# Patient Record
Sex: FEMALE | Race: WHITE | Employment: FULL TIME | URBAN - METROPOLITAN AREA
[De-identification: names, ages, dates, MRNs, and addresses within clinical notes are randomized per-mention and may not be internally consistent; named-entity substitution may affect disease eponyms.]

---

## 2019-01-04 ENCOUNTER — APPOINTMENT (OUTPATIENT)
Dept: MRI IMAGING | Age: 24
End: 2019-01-04
Payer: MEDICAID

## 2019-01-04 ENCOUNTER — HOSPITAL ENCOUNTER (EMERGENCY)
Age: 24
Discharge: HOME | End: 2019-01-04
Attending: EMERGENCY MEDICINE
Payer: MEDICAID

## 2019-01-04 ENCOUNTER — APPOINTMENT (OUTPATIENT)
Dept: ULTRASOUND IMAGING | Age: 24
End: 2019-01-04
Payer: MEDICAID

## 2019-01-04 VITALS
HEART RATE: 89 BPM | TEMPERATURE: 98.1 F | HEIGHT: 63 IN | DIASTOLIC BLOOD PRESSURE: 87 MMHG | SYSTOLIC BLOOD PRESSURE: 142 MMHG | BODY MASS INDEX: 36.32 KG/M2 | WEIGHT: 205 LBS | RESPIRATION RATE: 18 BRPM | OXYGEN SATURATION: 97 %

## 2019-01-04 DIAGNOSIS — R10.2 PELVIC PAIN AFFECTING PREGNANCY IN FIRST TRIMESTER, ANTEPARTUM: Primary | ICD-10-CM

## 2019-01-04 DIAGNOSIS — N83.201 CYST OF RIGHT OVARY: ICD-10-CM

## 2019-01-04 DIAGNOSIS — O26.891 PELVIC PAIN AFFECTING PREGNANCY IN FIRST TRIMESTER, ANTEPARTUM: Primary | ICD-10-CM

## 2019-01-04 LAB
A/G RATIO: 1.3 (ref 1.1–2.2)
ABO/RH: NORMAL
ALBUMIN SERPL-MCNC: 4 G/DL (ref 3.4–5)
ALP BLD-CCNC: 71 U/L (ref 40–129)
ALT SERPL-CCNC: 19 U/L (ref 10–40)
ANION GAP SERPL CALCULATED.3IONS-SCNC: 12 MMOL/L (ref 3–16)
AST SERPL-CCNC: 21 U/L (ref 15–37)
BASOPHILS ABSOLUTE: 0.1 K/UL (ref 0–0.2)
BASOPHILS RELATIVE PERCENT: 0.8 %
BILIRUB SERPL-MCNC: <0.2 MG/DL (ref 0–1)
BILIRUBIN URINE: NEGATIVE
BLOOD, URINE: NEGATIVE
BUN BLDV-MCNC: 6 MG/DL (ref 7–20)
CALCIUM SERPL-MCNC: 9.2 MG/DL (ref 8.3–10.6)
CHLORIDE BLD-SCNC: 106 MMOL/L (ref 99–110)
CLARITY: CLEAR
CO2: 20 MMOL/L (ref 21–32)
COLOR: YELLOW
CREAT SERPL-MCNC: <0.5 MG/DL (ref 0.6–1.1)
EOSINOPHILS ABSOLUTE: 0.2 K/UL (ref 0–0.6)
EOSINOPHILS RELATIVE PERCENT: 1.5 %
GFR AFRICAN AMERICAN: >60
GFR NON-AFRICAN AMERICAN: >60
GLOBULIN: 3.2 G/DL
GLUCOSE BLD-MCNC: 85 MG/DL (ref 70–99)
GLUCOSE URINE: NEGATIVE MG/DL
GONADOTROPIN, CHORIONIC (HCG) QUANT: NORMAL MIU/ML
HCT VFR BLD CALC: 37.2 % (ref 36–48)
HEMOGLOBIN: 12.6 G/DL (ref 12–16)
KETONES, URINE: NEGATIVE MG/DL
LEUKOCYTE ESTERASE, URINE: NEGATIVE
LIPASE: 39 U/L (ref 13–60)
LYMPHOCYTES ABSOLUTE: 2.4 K/UL (ref 1–5.1)
LYMPHOCYTES RELATIVE PERCENT: 23.3 %
MCH RBC QN AUTO: 30.7 PG (ref 26–34)
MCHC RBC AUTO-ENTMCNC: 33.8 G/DL (ref 31–36)
MCV RBC AUTO: 90.8 FL (ref 80–100)
MICROSCOPIC EXAMINATION: NORMAL
MONOCYTES ABSOLUTE: 0.6 K/UL (ref 0–1.3)
MONOCYTES RELATIVE PERCENT: 5.7 %
NEUTROPHILS ABSOLUTE: 7.1 K/UL (ref 1.7–7.7)
NEUTROPHILS RELATIVE PERCENT: 68.7 %
NITRITE, URINE: NEGATIVE
PDW BLD-RTO: 12.7 % (ref 12.4–15.4)
PH UA: 7.5
PLATELET # BLD: 250 K/UL (ref 135–450)
PMV BLD AUTO: 8.4 FL (ref 5–10.5)
POTASSIUM SERPL-SCNC: 4 MMOL/L (ref 3.5–5.1)
PROTEIN UA: NEGATIVE MG/DL
RBC # BLD: 4.1 M/UL (ref 4–5.2)
SODIUM BLD-SCNC: 138 MMOL/L (ref 136–145)
SPECIFIC GRAVITY UA: 1.01
TOTAL PROTEIN: 7.2 G/DL (ref 6.4–8.2)
URINE REFLEX TO CULTURE: NORMAL
URINE TYPE: NORMAL
UROBILINOGEN, URINE: 0.2 E.U./DL
WBC # BLD: 10.3 K/UL (ref 4–11)

## 2019-01-04 PROCEDURE — 81003 URINALYSIS AUTO W/O SCOPE: CPT

## 2019-01-04 PROCEDURE — 36415 COLL VENOUS BLD VENIPUNCTURE: CPT

## 2019-01-04 PROCEDURE — 80053 COMPREHEN METABOLIC PANEL: CPT

## 2019-01-04 PROCEDURE — 86901 BLOOD TYPING SEROLOGIC RH(D): CPT

## 2019-01-04 PROCEDURE — 99284 EMERGENCY DEPT VISIT MOD MDM: CPT

## 2019-01-04 PROCEDURE — 76817 TRANSVAGINAL US OBSTETRIC: CPT

## 2019-01-04 PROCEDURE — 83690 ASSAY OF LIPASE: CPT

## 2019-01-04 PROCEDURE — 85025 COMPLETE CBC W/AUTO DIFF WBC: CPT

## 2019-01-04 PROCEDURE — 86900 BLOOD TYPING SEROLOGIC ABO: CPT

## 2019-01-04 PROCEDURE — 74181 MRI ABDOMEN W/O CONTRAST: CPT

## 2019-01-04 PROCEDURE — 84702 CHORIONIC GONADOTROPIN TEST: CPT

## 2019-01-04 RX ORDER — ACETAMINOPHEN 325 MG/1
650 TABLET ORAL EVERY 6 HOURS PRN
Qty: 30 TABLET | Refills: 0 | Status: SHIPPED | OUTPATIENT
Start: 2019-01-04 | End: 2019-11-27

## 2019-01-04 RX ORDER — LIDOCAINE HYDROCHLORIDE AND EPINEPHRINE 10; 10 MG/ML; UG/ML
20 INJECTION, SOLUTION INFILTRATION; PERINEURAL ONCE
Status: DISCONTINUED | OUTPATIENT
Start: 2019-01-04 | End: 2019-01-04

## 2019-01-04 RX ORDER — MORPHINE SULFATE 2 MG/ML
2 INJECTION, SOLUTION INTRAMUSCULAR; INTRAVENOUS ONCE
Status: DISCONTINUED | OUTPATIENT
Start: 2019-01-04 | End: 2019-01-05 | Stop reason: HOSPADM

## 2019-01-04 ASSESSMENT — ENCOUNTER SYMPTOMS
NAUSEA: 1
DIARRHEA: 0
SHORTNESS OF BREATH: 0
VOMITING: 0
CHEST TIGHTNESS: 0
ABDOMINAL PAIN: 1

## 2019-01-04 ASSESSMENT — PAIN SCALES - GENERAL: PAINLEVEL_OUTOF10: 8

## 2019-11-27 PROBLEM — I10 ESSENTIAL HYPERTENSION: Status: ACTIVE | Noted: 2019-11-27

## 2023-02-16 ENCOUNTER — OFFICE VISIT (OUTPATIENT)
Dept: ENT CLINIC | Age: 28
End: 2023-02-16
Payer: COMMERCIAL

## 2023-02-16 VITALS
TEMPERATURE: 97.7 F | WEIGHT: 230 LBS | HEART RATE: 87 BPM | HEIGHT: 64 IN | DIASTOLIC BLOOD PRESSURE: 84 MMHG | OXYGEN SATURATION: 98 % | SYSTOLIC BLOOD PRESSURE: 142 MMHG | BODY MASS INDEX: 39.27 KG/M2

## 2023-02-16 DIAGNOSIS — H91.93 BILATERAL HEARING LOSS, UNSPECIFIED HEARING LOSS TYPE: ICD-10-CM

## 2023-02-16 DIAGNOSIS — R51.9 FACIAL PAIN: Primary | ICD-10-CM

## 2023-02-16 DIAGNOSIS — R43.9 DYSOSMIA: ICD-10-CM

## 2023-02-16 DIAGNOSIS — J30.9 ALLERGIC RHINITIS, UNSPECIFIED SEASONALITY, UNSPECIFIED TRIGGER: ICD-10-CM

## 2023-02-16 DIAGNOSIS — R43.8 HYPOSMIA: ICD-10-CM

## 2023-02-16 PROCEDURE — 3077F SYST BP >= 140 MM HG: CPT | Performed by: STUDENT IN AN ORGANIZED HEALTH CARE EDUCATION/TRAINING PROGRAM

## 2023-02-16 PROCEDURE — 99204 OFFICE O/P NEW MOD 45 MIN: CPT | Performed by: STUDENT IN AN ORGANIZED HEALTH CARE EDUCATION/TRAINING PROGRAM

## 2023-02-16 PROCEDURE — G8427 DOCREV CUR MEDS BY ELIG CLIN: HCPCS | Performed by: STUDENT IN AN ORGANIZED HEALTH CARE EDUCATION/TRAINING PROGRAM

## 2023-02-16 PROCEDURE — 3079F DIAST BP 80-89 MM HG: CPT | Performed by: STUDENT IN AN ORGANIZED HEALTH CARE EDUCATION/TRAINING PROGRAM

## 2023-02-16 PROCEDURE — G8484 FLU IMMUNIZE NO ADMIN: HCPCS | Performed by: STUDENT IN AN ORGANIZED HEALTH CARE EDUCATION/TRAINING PROGRAM

## 2023-02-16 PROCEDURE — 4004F PT TOBACCO SCREEN RCVD TLK: CPT | Performed by: STUDENT IN AN ORGANIZED HEALTH CARE EDUCATION/TRAINING PROGRAM

## 2023-02-16 PROCEDURE — G8417 CALC BMI ABV UP PARAM F/U: HCPCS | Performed by: STUDENT IN AN ORGANIZED HEALTH CARE EDUCATION/TRAINING PROGRAM

## 2023-02-16 RX ORDER — MONTELUKAST SODIUM 10 MG/1
10 TABLET ORAL DAILY
Qty: 30 TABLET | Refills: 3 | Status: SHIPPED | OUTPATIENT
Start: 2023-02-16

## 2023-02-16 RX ORDER — GUAIFENESIN 600 MG/1
600 TABLET, EXTENDED RELEASE ORAL 2 TIMES DAILY
COMMUNITY
Start: 2023-02-06 | End: 2023-02-20

## 2023-02-16 NOTE — PROGRESS NOTES
[facility]  DIVISION OF OTOLARYNGOLOGY- HEAD & NECK SURGERY  NEW PATIENT HISTORY AND PHYSICAL NOTE      Patient Name: Elma Walters  Medical Record Number:  [id number]  Primary Care Physician:  SHAVONNE HARDING    ChiefComplaint     Chief Complaint   Patient presents with    Other     Pressure pain on Lt side behind ear and Lt sinuses        History of Present Illness     Elma Walters is an 27 y.o. female presenting with extreme pain along her left cheek which radiates into the back left of her scalp.  This has been going on for 1 month.  Her PCP gave her an antibiotic, Flonase, Mucinex which helped her symptoms for 1 week but then soon returned.  Currently feels like she has a dull ache in these areas.  Every morning she has a lot of clear nasal drainage.  She has been diagnosed with corvette 4 separate times and ever since has had increased nasal drainage and diminished sense of smell.  Certain things smell like \"burning ketchup\".  Still on Flonase daily.  No otorrhea.  Denies gum chewing or nocturnal bruxism.  She has had constant nasal congestion which has been worse recently.  She has fullness in the midface.  She has never been allergy tested but thinks she does have underlying allergies.  She does have a history of asthma as a child.  Feels like she has had diminished hearing for many years.      Past Medical History     Past Medical History:   Diagnosis Date    Asthma     Congenital absence of one kidney     UTI (lower urinary tract infection)     one kidney       Past Surgical History     Past Surgical History:   Procedure Laterality Date    TONSILLECTOMY AND ADENOIDECTOMY         Family History     Family History   Problem Relation Age of Onset    Diabetes Mother     Hypertension Father        Social History     Social History     Tobacco Use    Smoking status: Every Day     Packs/day: 0.50     Types: Cigarettes    Smokeless tobacco: Never   Substance Use Topics    Alcohol use: No    Drug use: No  Allergies     Allergies   Allergen Reactions    Codeine     Labetalol      Chest pains, vomiting, dizziness, shortness of breath,     Tramadol Hives and Rash       Medications     Current Outpatient Medications   Medication Sig Dispense Refill    guaiFENesin (MUCINEX) 600 MG extended release tablet Take 600 mg by mouth 2 times daily      montelukast (SINGULAIR) 10 MG tablet Take 1 tablet by mouth daily 30 tablet 3    spironolactone (ALDACTONE) 50 MG tablet Take 1 tablet by mouth daily 30 tablet 3    NIFEdipine (PROCARDIA XL) 90 MG extended release tablet Take 1 tablet by mouth in the morning. 90 tablet 2    acetaminophen (TYLENOL) 500 MG tablet Take 500 mg by mouth as needed       No current facility-administered medications for this visit. Review of Systems     REVIEW OF SYSTEMS    See HPI Above    PhysicalExam     Vitals:    02/16/23 1510   BP: (!) 142/84   Pulse: 87   Temp: 97.7 °F (36.5 °C)   TempSrc: Temporal   SpO2: 98%   Weight: 230 lb (104.3 kg)   Height: 5' 4\" (1.626 m)       PHYSICAL EXAM  BP (!) 142/84   Pulse 87   Temp 97.7 °F (36.5 °C) (Temporal)   Ht 5' 4\" (1.626 m)   Wt 230 lb (104.3 kg)   SpO2 98%   BMI 39.48 kg/m²     GENERAL: No acute distress, alert and oriented  EYES: EOMI, Anti-icteric  NOSE: On anterior rhinoscopy there is no epistaxis, nasal mucosa moist and normal appearing, no purulent drainage. Bilateral inferior turbinate hypertrophy. EARS: Normal external appearance; on portable otomicroscopy:     -Ad: External auditory canal without stenosis, tympanic membrane clear, no middle ear effusions or retractions.      -As: External auditory canal without stenosis, tympanic membrane clear, no middle ear effusions or retractions.    Pneumatic otoscopy: Bilateral tympanic membranes mobile pneumatic otoscopy  FACE: HB 1/6 bilaterally, symmetric appearing, sensation equal bilaterally  ORAL CAVITY: No masses or lesions visualized or palpated, uvula is midline, moist mucous membranes, no oropharyngeal masses or oropharyngeal obstruction  NECK: Normal range of motion, no thyromegaly, trachea is midline, no palpable lymphadenopathy or neck masses, no crepitus  NEURO: Cranial Nerves 2, 3, 4, 5, 6, 7, 11, 12 grossly intact bilaterally     I have performed a head and neck physical exam personally or was physically present during the key or critical portions of the service. Assessment and Plan     1. Facial pain  2. Hyposmia  3. Dysosmia  - CT SINUS FOR IMAGE GUIDANCE; Future  -Will further evaluate her symptoms with CT scan sinus to see if this is coming from a sinus standpoint or if we should be looking elsewhere for etiology such as migraines or neuropathy. 4. Allergic rhinitis, unspecified seasonality, unspecified trigger  -Continue Flonase daily. Start Singulair daily. 5. Bilateral hearing loss, unspecified hearing loss type  - [facility]  -We will obtain comprehensive audiological evaluation at follow-up    Follow Up     Return for after imaging. Zulema Gagnon   Department of Otolaryngology/Head & Neck Surgery  2/16/23    Medical Decision Making: The following items were considered in medical decision making:  Independent review of images  Review / order clinical lab tests  Review / order radiology tests  Decision to obtain old records    This note was generated completely or in part utilizing Dragon dictation speech recognition software. Occasionally, words are mistranscribed and despite editing, the text may contain inaccuracies due to incorrect word recognition. If further clarification is needed please contact the office at [phone].

## 2023-04-26 ENCOUNTER — TELEPHONE (OUTPATIENT)
Dept: ENT CLINIC | Age: 28
End: 2023-04-26

## 2023-04-26 DIAGNOSIS — R51.9 FACIAL PAIN: Primary | ICD-10-CM

## 2023-04-26 DIAGNOSIS — R43.9 DYSOSMIA: ICD-10-CM

## 2023-04-26 DIAGNOSIS — R43.8 HYPOSMIA: ICD-10-CM

## 2023-04-26 NOTE — TELEPHONE ENCOUNTER
Central scheduling is calling to get a CT order resubmitted. It was denied because it does not say if it needs to be with contrast or without. Also, it does not say what care has taken place before the CT was ordered.

## 2023-05-11 ENCOUNTER — HOSPITAL ENCOUNTER (OUTPATIENT)
Dept: CT IMAGING | Age: 28
Discharge: HOME | End: 2023-05-11
Payer: COMMERCIAL

## 2023-05-11 DIAGNOSIS — R43.9 DYSOSMIA: ICD-10-CM

## 2023-05-11 DIAGNOSIS — R43.8 HYPOSMIA: ICD-10-CM

## 2023-05-11 DIAGNOSIS — R51.9 FACIAL PAIN: ICD-10-CM

## 2023-05-11 PROCEDURE — 70486 CT MAXILLOFACIAL W/O DYE: CPT

## 2023-05-18 ENCOUNTER — OFFICE VISIT (OUTPATIENT)
Dept: ENT CLINIC | Age: 28
End: 2023-05-18
Payer: COMMERCIAL

## 2023-05-18 VITALS
OXYGEN SATURATION: 98 % | HEIGHT: 64 IN | HEART RATE: 86 BPM | TEMPERATURE: 97.7 F | WEIGHT: 235 LBS | DIASTOLIC BLOOD PRESSURE: 80 MMHG | SYSTOLIC BLOOD PRESSURE: 125 MMHG | BODY MASS INDEX: 40.12 KG/M2

## 2023-05-18 DIAGNOSIS — G44.89 SLUDER'S NEURALGIA: Primary | ICD-10-CM

## 2023-05-18 DIAGNOSIS — J34.3 HYPERTROPHY OF BOTH INFERIOR NASAL TURBINATES: ICD-10-CM

## 2023-05-18 DIAGNOSIS — J34.2 DNS (DEVIATED NASAL SEPTUM): ICD-10-CM

## 2023-05-18 DIAGNOSIS — R09.81 NASAL CONGESTION: ICD-10-CM

## 2023-05-18 PROCEDURE — 3074F SYST BP LT 130 MM HG: CPT | Performed by: STUDENT IN AN ORGANIZED HEALTH CARE EDUCATION/TRAINING PROGRAM

## 2023-05-18 PROCEDURE — 3079F DIAST BP 80-89 MM HG: CPT | Performed by: STUDENT IN AN ORGANIZED HEALTH CARE EDUCATION/TRAINING PROGRAM

## 2023-05-18 PROCEDURE — 99214 OFFICE O/P EST MOD 30 MIN: CPT | Performed by: STUDENT IN AN ORGANIZED HEALTH CARE EDUCATION/TRAINING PROGRAM

## 2023-05-18 PROCEDURE — 4004F PT TOBACCO SCREEN RCVD TLK: CPT | Performed by: STUDENT IN AN ORGANIZED HEALTH CARE EDUCATION/TRAINING PROGRAM

## 2023-05-18 PROCEDURE — G8427 DOCREV CUR MEDS BY ELIG CLIN: HCPCS | Performed by: STUDENT IN AN ORGANIZED HEALTH CARE EDUCATION/TRAINING PROGRAM

## 2023-05-18 PROCEDURE — G8417 CALC BMI ABV UP PARAM F/U: HCPCS | Performed by: STUDENT IN AN ORGANIZED HEALTH CARE EDUCATION/TRAINING PROGRAM

## 2023-05-18 RX ORDER — HYDROCODONE BITARTRATE AND ACETAMINOPHEN 7.5; 325 MG/1; MG/1
TABLET ORAL
COMMUNITY
Start: 2023-05-03

## 2023-05-18 NOTE — PROGRESS NOTES
retractions  SKIN: No rashes, normal appearing skin, no evidence of skin lesions/tumors  NEURO: Cranial Nerves 2, 3, 4, 5, 6, 7, 11, 12 grossly intact bilaterally     I have performed a head and neck physical exam personally or was physically present during the key or critical portions of the service. Data/Imaging Review     CT Result (most recent):  CT SINUS FOR IMAGE GUIDANCE 05/11/2023    Narrative  EXAMINATION:  CT OF THE SINUS WITHOUT CONTRAST 5/11/2023 6:20 pm    TECHNIQUE:  CT of the sinuses was performed without the administration of intravenous  contrast. Multiplanar reformatted images are provided for review. Automated  exposure control, iterative reconstruction, and/or weight based adjustment of  the mA/kV was utilized to reduce the radiation dose to as low as reasonably  achievable. COMPARISON:  None    HISTORY:  ORDERING SYSTEM PROVIDED HISTORY: Facial pain  TECHNOLOGIST PROVIDED HISTORY:  Reason for Exam: Dx: Facial pain R51.9 (ICD-10-CM); Hyposmia R43.8  (ICD-10-CM); Dysosmia R43.9 (ICD-10-CM)    FINDINGS:  LEFT OMC: The left ostiomeatal complex and frontal recess are patent. Left  maxillary, anterior ethmoid, and frontal sinuses are clear. RIGHT OMC: The right ostiomeatal complex and frontal recess are patent. Right  anterior ethmoid, and frontal sinuses are clear. Mucous retention cyst in  the right maxillary sinus. SPHENOETHMOID: The sphenoethmoidal recesses are patent, and the bilateral  posterior ethmoid and sphenoid sinuses are clear. Sphenoid pneumatization  does not extend into the anterior clinoid processes. The optic nerve and  carotid canals are not dehiscent. The ethmoid roofs are symmetric    NASAL CAVITY: Nasal cavity is clear. Turbinate anatomy is conventional.    OTHER: The mastoid air cells are well aerated. Impression  Mucous retention cyst in the right maxillary sinus.     Images from CT sinus without contrast performed on 5/11/2023 independently reviewed which

## 2023-05-22 LAB
ALBUMIN SERPL-MCNC: 4.6 G/DL (ref 3.5–5.7)
ANION GAP 4: 10 MMOL/L (ref 7–16)
BASOPHILS # BLD: 0.6 %
BASOPHILS ABSOLUTE: 0.1 K/UL (ref 0–0.1)
BUN BLDV-MCNC: 8 MG/DL (ref 7–25)
CALCIUM SERPL-MCNC: 9.7 MG/DL (ref 8.6–10.2)
CHLORIDE BLD-SCNC: 102 MMOL/L (ref 98–107)
CO2: 24 MMOL/L (ref 21–31)
CREAT SERPL-MCNC: 0.76 MG/DL (ref 0.6–1.2)
CREATININE URINE: 80.49 MG/DL
EGFR FEMALE: > 90 ML/MIN/1.73M2
EOSINOPHILS ABSOLUTE: 0.2 K/UL (ref 0–0.4)
EOSINOPHILS RELATIVE PERCENT: 1.8 %
GLUCOSE: 78 MG/DL (ref 74–109)
HEMOGLOBIN URINE, QUAL: 13.6 G/DL (ref 12.1–15.8)
LYMPHOCYTES # BLD: 40.6 %
LYMPHOCYTES ABSOLUTE: 4.1 K/UL (ref 0.8–3.6)
MCH RBC QN AUTO: 29.4 PG (ref 28.4–33.4)
MCHC RBC AUTO-ENTMCNC: 33.7 G/DL (ref 31.1–37)
MCV RBC AUTO: 87.1 FL (ref 85–99)
MONOCYTES # BLD: 5.7 %
MONOCYTES ABSOLUTE: 0.6 K/UL (ref 0.3–0.9)
NEUTROPHILS ABSOLUTE: 5.1 K/UL (ref 2–7.3)
NEUTROPHILS/100 LEUKOCYTES: 51.3 %
PDW BLD-RTO: 13.3 % (ref 11.7–15.2)
PHOSPHORUS: 3.8 MG/DL (ref 2.5–5)
PLATELET COUNT MANUAL: 285 K/UL (ref 154–393)
POTASSIUM SERPL-SCNC: 4 MMOL/L (ref 3.5–5.1)
PROTEIN UA: 5 MG/DL
PROTEIN/CREAT RATIO URINE RAN: 0.1 MG/DL (ref 0–0.1)
RBC # BLD: 4.62 M/UL (ref 3.86–5.17)
SODIUM BLD-SCNC: 136 MMOL/L (ref 136–145)
SR HEMATOCRIT: 40.3 % (ref 35.8–46.5)
WBC BLOOD, MANUAL: 10 K/UL (ref 4–10.5)

## 2023-06-28 ENCOUNTER — TELEPHONE (OUTPATIENT)
Dept: ENT CLINIC | Age: 28
End: 2023-06-28

## 2025-07-28 ENCOUNTER — OFFICE VISIT (OUTPATIENT)
Age: 30
End: 2025-07-28

## 2025-07-28 VITALS
HEART RATE: 88 BPM | WEIGHT: 211.4 LBS | SYSTOLIC BLOOD PRESSURE: 140 MMHG | OXYGEN SATURATION: 97 % | TEMPERATURE: 98.6 F | BODY MASS INDEX: 38.9 KG/M2 | DIASTOLIC BLOOD PRESSURE: 80 MMHG | HEIGHT: 62 IN

## 2025-07-28 DIAGNOSIS — J06.9 VIRAL UPPER RESPIRATORY TRACT INFECTION: Primary | ICD-10-CM

## 2025-07-28 DIAGNOSIS — I10 ELEVATED BLOOD PRESSURE READING IN OFFICE WITH DIAGNOSIS OF HYPERTENSION: ICD-10-CM

## 2025-07-28 LAB
INFLUENZA A ANTIGEN, POC: NEGATIVE
INFLUENZA B ANTIGEN, POC: NEGATIVE

## 2025-07-28 RX ORDER — FLUTICASONE PROPIONATE 50 MCG
2 SPRAY, SUSPENSION (ML) NASAL DAILY
Qty: 16 G | Refills: 0 | Status: SHIPPED | OUTPATIENT
Start: 2025-07-28

## 2025-07-28 NOTE — PROGRESS NOTES
Elma Walters (:  1995) is a 30 y.o. female,New patient, here for evaluation of the following chief complaint(s):  Fever (Fever since yesterday. Body aches, pain in right ear. )      Assessment & Plan :  Visit Diagnoses and Associated Orders         Viral upper respiratory tract infection    -  Primary    fluticasone (FLONASE) 50 MCG/ACT nasal spray [74796]      POCT Influenza A/B Antigen (BD Veritor) [58956 Custom]             Elevated blood pressure reading in office with diagnosis of hypertension        Amb Referral to Primary Care [[id number] Custom]                    Patient presents with complaints of fever, myalgias, a sensation of ear fullness, nasal congestion, fatigue, and a nonproductive cough. Symptoms have been ongoing for several days. A rapid influenza test performed in clinic was negative. Due to limited supply, the patient was advised to obtain an over-the-counter SARS-CoV-2 test and verbalized understanding. She reports a history of seasonal allergic rhinitis, and her current symptoms are consistent with a viral upper respiratory infection versus allergy-related symptoms such as post-nasal drip, fatigue, and upper airway irritation. She was prescribed fluticasone nasal spray and instructed to take Allegra in the morning and Xyzal in the evening for symptomatic relief. At today’s visit, the patient was also noted to have an elevated blood pressure reading. She is currently being managed for hypertension. Education was provided regarding signs and symptoms of myocardial infarction (MI) including chest pain or pressure, pain radiating to the jaw, neck, or left arm, dyspnea, diaphoresis, nausea, and lightheadedness. She was also educated on signs of stroke including unilateral weakness or numbness, facial droop, dysarthria, vision changes, and loss of coordination. The patient was advised to seek immediate medical attention if she experiences any of the above symptoms, if her blood pressure

## 2025-07-28 NOTE — PATIENT INSTRUCTIONS
You were seen today for symptoms including fever, body aches, ear fullness, nasal congestion, fatigue, and coughing. Your rapid flu test was negative. Since the clinic is currently out of COVID-19 tests, you were advised to take an over-the-counter COVID test at home. You understood and agreed to this plan.  Your symptoms may be due to a viral upper respiratory infection or seasonal allergies, which can cause body aches, fatigue, congestion, ear pressure, and coughing due to post-nasal drip.  You were prescribed:  Fluticasone nasal spray to use daily to reduce nasal inflammation and improve breathing  Additional recommendations:  Stay well hydrated and get plenty of rest  Use a humidifier at home to ease congestion  You may take Allegra in the morning and Xyzal at night to help relieve allergy symptoms  Avoid smoke, strong fragrances, and allergens that could worsen your symptoms  Seek immediate medical attention if you experience:  High fever  Trouble breathing or chest pain  Confusion or weakness  Persistent vomiting or signs of dehydration  Worsening symptoms or no improvement after a few days of treatment  Follow up with your provider if symptoms continue or new concerns arise.

## 2025-08-23 ENCOUNTER — OFFICE VISIT (OUTPATIENT)
Age: 30
End: 2025-08-23

## 2025-08-23 VITALS
SYSTOLIC BLOOD PRESSURE: 128 MMHG | WEIGHT: 211.4 LBS | DIASTOLIC BLOOD PRESSURE: 80 MMHG | BODY MASS INDEX: 38.67 KG/M2 | TEMPERATURE: 98.6 F | HEART RATE: 90 BPM | OXYGEN SATURATION: 97 %

## 2025-08-23 DIAGNOSIS — L03.114 CELLULITIS OF LEFT HAND: Primary | ICD-10-CM

## 2025-08-23 RX ORDER — CEPHALEXIN 500 MG/1
500 CAPSULE ORAL 4 TIMES DAILY
Qty: 40 CAPSULE | Refills: 0 | Status: SHIPPED | OUTPATIENT
Start: 2025-08-23 | End: 2025-09-02

## 2025-08-23 RX ORDER — HYDROCODONE BITARTRATE AND ACETAMINOPHEN 5; 325 MG/1; MG/1
1 TABLET ORAL EVERY 6 HOURS PRN
COMMUNITY

## 2025-08-23 ASSESSMENT — ENCOUNTER SYMPTOMS
RHINORRHEA: 0
SORE THROAT: 0
SHORTNESS OF BREATH: 0
VOMITING: 0